# Patient Record
Sex: FEMALE | Race: WHITE
[De-identification: names, ages, dates, MRNs, and addresses within clinical notes are randomized per-mention and may not be internally consistent; named-entity substitution may affect disease eponyms.]

---

## 2019-08-30 ENCOUNTER — HOSPITAL ENCOUNTER (EMERGENCY)
Dept: HOSPITAL 60 - LB.ED | Age: 44
Discharge: HOME | End: 2019-08-30
Payer: COMMERCIAL

## 2019-08-30 VITALS — DIASTOLIC BLOOD PRESSURE: 86 MMHG | SYSTOLIC BLOOD PRESSURE: 131 MMHG

## 2019-08-30 DIAGNOSIS — H83.09: Primary | ICD-10-CM

## 2019-08-30 DIAGNOSIS — B97.89: ICD-10-CM

## 2019-08-30 NOTE — CT
DATE OF SERVICE: 08/30/2019

CLINICAL DATA: Vertigo



Unenhanced brain CT:



Multislice axial acquisition was performed.



No priors. 



No masses or mass effect. No intracranial hemorrhage. No evidence of acute or 
subacute infarct.



There is a 4 mm round calcification in the thalamus on the right. This is most 
likely related to prior infectious or inflammatory process.



No osseous abnormalities.



There is a small fluid density lesion in the right ethmoid sinuses consistent 
with a small retention cyst.



Otherwise negative.

MTDD

## 2019-08-30 NOTE — EDM.PDOC
ED HPI GENERAL MEDICAL PROBLEM





- General


Chief Complaint: General


Stated Complaint: POSSIBLE VERTIGO


Time Seen by Provider: 08/30/19 08:20


Source of Information: Reports: Patient


History Limitations: Reports: No Limitations





- History of Present Illness


INITIAL COMMENTS - FREE TEXT/NARRATIVE: 


According to patient she claims she started to have dizzy episodes 2 days ago 

and has got worse. She claims she cannot move without feeling dizzy. Patient 

claims any movement of her body make the room  spin and then she starts to 

feels nauseous and upset stomach. Has not had any vomiting. Symptoms has got 

worse today.





Pt does claims she gets short episodes sometimes when she is on the boat, and 

applies some OTC motion sickness  patch. But, this time it is not helping.





Pt claims she has ike feeling pressure in her head.





Also she had stomach and sick last week, which resolved without any problems.





No ear ache or fullness. No ringing in the ears or drainage. No fever or chills.





Onset Date: 08/28/19


Duration: Getting Worse, Intermittent


Improves with: Reports: Rest


Worsens with: Reports: Movement


Associated Symptoms: Denies: Confusion, Chest Pain, Cough, Diaphoresis, Fever/

Chills, Headaches, Nausea/Vomiting, Rash, Seizure, Shortness of Breath, Syncope

, Weakness





- Related Data


 Allergies











Allergy/AdvReac Type Severity Reaction Status Date / Time


 


No Known Allergies Allergy   Verified 08/30/19 08:12














Past Medical History


OB/GYN History: Reports: Pregnancy


Neurological History: Reports: Other (See Below)


Other Neuro History: motion sickness after having children





Social & Family History





- Family History


Family Medical History: Noncontributory





- Tobacco Use


Smoking Status *Q: Never Smoker





- Caffeine Use


Caffeine Use: Reports: Coffee





- Recreational Drug Use


Recreational Drug Use: No





ED ROS GENERAL





- Review of Systems


Review Of Systems: See Below


Constitutional: Denies: Fever, Chills, Weakness


HEENT: Denies: Ear Pain, Rhinitis, Throat Pain


Respiratory: Denies: Shortness of Breath, Pleuritic Chest Pain, Cough, Sputum


Cardiovascular: Denies: Chest Pain, Lightheadedness


GI/Abdominal: Reports: Nausea.  Denies: Abdominal Pain, Constipation, Diarrhea, 

Vomiting


: Denies: Flank Pain, Frequency


Musculoskeletal: Denies: Joint Pain, Joint Swelling


Skin: Denies: Bruising, Pruritis, Rash


Neurological: Reports: Dizziness.  Denies: Confusion, Headache, Numbness, 

Tingling, Weakness, Change in Speech, Gait Disturbance





ED EXAM, GENERAL





- Physical Exam


Exam: See Below


Exam Limited By: No Limitations


General Appearance: Alert, WD/WN, No Apparent Distress


Eye Exam: Bilateral Eye: EOMI, PERRL


Ears: Normal External Exam, Normal Canal, Hearing Grossly Normal, Normal TMs


Ear Exam: Bilateral Ear: Auricle Normal, Canal Normal, TM normal


Nose: Normal Inspection, Normal Mucosa, No Blood


Throat/Mouth: Normal Inspection, Normal Lips, Normal Teeth, Normal Gums, Normal 

Oropharynx, Normal Voice, No Airway Compromise


Head: Atraumatic, Normocephalic


Neck: Normal Inspection, Supple, Non-Tender, Full Range of Motion, Other (On 

head maneuvering at 30 degrees patient does have elicitable vertigo with 

nystagmus which lasts for less then 10 seconds and resovles. )


Respiratory/Chest: No Respiratory Distress, Lungs Clear, Normal Breath Sounds, 

No Accessory Muscle Use, Chest Non-Tender


Cardiovascular: Normal Peripheral Pulses, Regular Rate, Rhythm, No Edema, No 

Gallop, No JVD, No Murmur, No Rub





Course





- Vital Signs


Text/Narrative:: 


Pt appears to have acute labyrinthitis. Her CT head is negative and her CBC is 

normal. This does appear like viral labyrinthitis, which is the most common 

type. Reassured , and mechanism discussed with patient. Advised rest. Avoid  

frequent movement of the head. Advised to keep the eyes closed when making any 

turn. No driving  until the symptoms resolves. Will take 3-5 days and gradually 

improve. Started her on meclizine 25mg 3 times daily for 5 days.





Pt is feeling better after resting and meclizine. Nausea has resolved and her 

vertigo has improved.Followup if symptoms worsen.





Last Recorded V/S: 


 Last Vital Signs











Temp  97.6 F   08/30/19 08:05


 


Pulse  77   08/30/19 08:05


 


Resp  20   08/30/19 08:05


 


BP  131/86   08/30/19 08:05


 


Pulse Ox  100   08/30/19 08:05














- Orders/Labs/Meds


Orders: 


 Active Orders 24 hr











 Category Date Time Status


 


 Head wo Cont [CT] Stat Exams  08/30/19 08:44 Taken











Labs: 


 Laboratory Tests











  08/30/19 Range/Units





  08:50 


 


WBC  6.7  (4.0-11.0)  K/uL


 


RBC  4.73  (3.80-5.80)  M/uL


 


Hgb  13.9  (11.5-16.5)  g/dL


 


Hct  40.9  (37.0-47.0)  %


 


MCV  87  (76-96)  fL


 


MCH  29.4  (27.0-32.0)  pg


 


MCHC  34.0  (31.0-35.0)  g/dL


 


RDW  12.2  (11.0-16.0)  %


 


Plt Count  206  (150-500)  K/uL


 


MPV  9.2  (6.0-10.0)  fL


 


Neut % (Auto)  72.5 H  (45.0-70.0)  %


 


Lymph % (Auto)  16.9 L  (20.0-40.0)  %


 


Mono % (Auto)  9.1  (3.0-10.0)  %


 


Eos % (Auto)  1.2  (1.0-5.0)  %


 


Baso % (Auto)  0.3  (0.0-0.5)  %


 


Neut # (Auto)  4.88  (2.00-7.50)  K/uL


 


Lymph # (Auto)  1.14 L  (1.50-4.00)  K/uL


 


Mono # (Auto)  0.61  (0.20-0.80)  K/uL


 


Eos # (Auto)  0.08  (0.04-0.40)  K/uL


 


Baso # (Auto)  0.02  (0.02-0.10)  K/uL











Meds: 


Medications














Discontinued Medications














Generic Name Dose Route Start Last Admin





  Trade Name David  PRN Reason Stop Dose Admin


 


Meclizine HCl  Confirm  08/30/19 08:49  08/30/19 09:06





  Antivert  Administered  08/30/19 08:50  Not Given





  Dose   





  25 mg   





  .ROUTE   





  .STK-MED ONE   





     





     





     





     


 


Meclizine HCl  25 mg  08/30/19 08:40  08/30/19 08:43





  Antivert  PO  08/30/19 08:41  25 mg





  ONETIME ONE   Administration





     





     





     





     














Departure





- Departure


Time of Disposition: 10:00


Disposition: Home, Self-Care 01


Condition: Fair


Clinical Impression: 


 Viral labyrinthitis








- Discharge Information


*PRESCRIPTION DRUG MONITORING PROGRAM REVIEWED*: Not Applicable


*COPY OF PRESCRIPTION DRUG MONITORING REPORT IN PATIENT LYLE: Not Applicable


Instructions:  Meclizine tablets or capsules, Labyrinthitis


Referrals: 


PCP,None [Primary Care Provider] - 


Forms:  ED Department Discharge


Additional Instructions: 


Discharge home.


Lots of rest for the next 3 days, keep eyes closed when you need to get up or 

change head position. Open eyes slowly.


No boat rides


No driving when you are feeling this way.


Meclizine 25mg by mouth 3 times a day. 


Follow up as needed with your primary provider.


Call or return to the ER if you have any questions or concerns. 





- Problem List & Annotations


(1) Viral labyrinthitis


SNOMED Code(s): 627903210


   Code(s): H83.09 - LABYRINTHITIS, UNSPECIFIED EAR   Status: Acute   





- Problem List Review


Problem List Initiated/Reviewed/Updated: Yes





- My Orders


Last 24 Hours: 


My Active Orders





08/30/19 08:44


Head wo Cont [CT] Stat 














- Assessment/Plan


Last 24 Hours: 


My Active Orders





08/30/19 08:44


Head wo Cont [CT] Stat 











Assessment:: 


Viral Labyrinthitis








Plan: 


Pt appears to have acute labyrinthitis. Her CT head is negative and her CBC is 

normal. This does appear like viral labyrinthitis, which is the most common 

type. Reassured , and mechanism discussed with patient. Advised rest. Avoid  

frequent movement of the head. Advised to keep the eyes closed when making any 

turn. No driving  until the symptoms resolves. Will take 3-5 days and gradually 

improve. Started her on meclizine 25mg 3 times daily for 5 days.





Pt is feeling better after resting and meclizine. Nausea has resolved and her 

vertigo has improved.Followup if symptoms worsen.